# Patient Record
Sex: FEMALE | Race: BLACK OR AFRICAN AMERICAN | NOT HISPANIC OR LATINO | URBAN - METROPOLITAN AREA
[De-identification: names, ages, dates, MRNs, and addresses within clinical notes are randomized per-mention and may not be internally consistent; named-entity substitution may affect disease eponyms.]

---

## 2022-06-05 ENCOUNTER — EMERGENCY (EMERGENCY)
Facility: HOSPITAL | Age: 22
LOS: 1 days | Discharge: HOME | End: 2022-06-05
Attending: EMERGENCY MEDICINE | Admitting: STUDENT IN AN ORGANIZED HEALTH CARE EDUCATION/TRAINING PROGRAM
Payer: MEDICAID

## 2022-06-05 VITALS
RESPIRATION RATE: 18 BRPM | TEMPERATURE: 98 F | SYSTOLIC BLOOD PRESSURE: 108 MMHG | OXYGEN SATURATION: 100 % | HEART RATE: 57 BPM | DIASTOLIC BLOOD PRESSURE: 69 MMHG

## 2022-06-05 VITALS
SYSTOLIC BLOOD PRESSURE: 100 MMHG | RESPIRATION RATE: 18 BRPM | HEART RATE: 96 BPM | TEMPERATURE: 98 F | DIASTOLIC BLOOD PRESSURE: 56 MMHG | OXYGEN SATURATION: 100 %

## 2022-06-05 LAB
APPEARANCE UR: ABNORMAL
BACTERIA # UR AUTO: ABNORMAL
BILIRUB UR-MCNC: NEGATIVE — SIGNIFICANT CHANGE UP
COLOR SPEC: YELLOW — SIGNIFICANT CHANGE UP
DIFF PNL FLD: ABNORMAL
EPI CELLS # UR: 1 /HPF — SIGNIFICANT CHANGE UP (ref 0–5)
GLUCOSE UR QL: NEGATIVE — SIGNIFICANT CHANGE UP
HYALINE CASTS # UR AUTO: 0 /LPF — SIGNIFICANT CHANGE UP (ref 0–7)
KETONES UR-MCNC: NEGATIVE — SIGNIFICANT CHANGE UP
LEUKOCYTE ESTERASE UR-ACNC: ABNORMAL
NITRITE UR-MCNC: POSITIVE
PH UR: 7 — SIGNIFICANT CHANGE UP (ref 5–8)
PROT UR-MCNC: ABNORMAL
RBC CASTS # UR COMP ASSIST: SIGNIFICANT CHANGE UP /HPF (ref 0–4)
SP GR SPEC: 1.02 — SIGNIFICANT CHANGE UP (ref 1–1.05)
UROBILINOGEN FLD QL: SIGNIFICANT CHANGE UP
WBC UR QL: SIGNIFICANT CHANGE UP /HPF (ref 0–5)

## 2022-06-05 PROCEDURE — 99285 EMERGENCY DEPT VISIT HI MDM: CPT

## 2022-06-05 PROCEDURE — 76830 TRANSVAGINAL US NON-OB: CPT | Mod: 26

## 2022-06-05 RX ORDER — CEFPODOXIME PROXETIL 100 MG
100 TABLET ORAL ONCE
Refills: 0 | Status: COMPLETED | OUTPATIENT
Start: 2022-06-05 | End: 2022-06-05

## 2022-06-05 RX ORDER — CEFPODOXIME PROXETIL 100 MG
1 TABLET ORAL
Qty: 28 | Refills: 0
Start: 2022-06-05 | End: 2022-06-18

## 2022-06-05 RX ADMIN — Medication 100 MILLIGRAM(S): at 15:24

## 2022-06-05 NOTE — ED PROVIDER NOTE - ATTENDING CONTRIBUTION TO CARE
I performed a face to face evaluation of this patient and performed a full history and physical examination on the patient.  I agree with the resident's history, physical examination, and plan of the patient.  22F G0 no significant PMH p/w vag spotting. VSS in ED. Pelvic exam with scant blood and IUD string in place, lungs ctab, abd NT  Will eval first with ua/ucx, ucg, gc/chlamydia and then reassess. Concern for STI vs. pyelo vs. simple UTI.  Right cvat mild. Pt declining iv and labs- understands risk of no iv and checking labs, and inability to give iv antibiotics.  PT is 98.8 rectally, well appearing- has capacity to decline treatment and understands risk.

## 2022-06-05 NOTE — ED PROVIDER NOTE - PROGRESS NOTE DETAILS
Bhanu LAWRENCE (PGY-2)  UA showing likely pyelo, however still pending tvus at this time. explained to pt benefit of placing IV to obtains labs and give 1 dose of IV abx, however pt is deferring at this time Bhanu LAWRENCE (PGY-2)  tuvs normal, will d/c to home with pcp f/u and return precautions

## 2022-06-05 NOTE — ED PROVIDER NOTE - NSFOLLOWUPINSTRUCTIONS_ED_ALL_ED_FT
You were found to have Pyelonephritis, which is an infection of the kidney    Please take Cefpodoxime twice a day for the next 14 days    Please take over the counter pain medications such as Motrin (600mg every 6 hours) and Tylenol (650mg every 4 hours) for pain, and follow up with your primary care doctor within the next 48-72 hours    Return to the Emergency Department if you have any new or worsening symptoms, including but not limited to persistent vomiting, severe flank/abdominal pain, dehydration, weakness, or uncontrollable fevers

## 2022-06-05 NOTE — ED PROVIDER NOTE - NS ED ROS FT
CONST: no fevers, no chills  ENT: no sore throat  CV: no chest pain, no leg swelling  RESP: no shortness of breath, no cough  ABD: +abdominal pain, +nausea, +vomiting, no diarrhea  : no dysuria, no flank pain, no hematuria, +vaginal spotting  MSK: no back pain, no extremity pain  SKIN:  no rash CONST: no fevers, no chills  ENT: no sore throat  CV: no chest pain, no leg swelling  RESP: no shortness of breath, no cough  ABD: +abdominal pain, +nausea, +vomiting, no diarrhea  : no dysuria,  flank pain, no hematuria, +vaginal spotting  MSK: no back pain, no extremity pain  SKIN:  no rash

## 2022-06-05 NOTE — ED PROVIDER NOTE - CARE PLAN
Principal Discharge DX:	Flank pain   1 Principal Discharge DX:	Flank pain  Secondary Diagnosis:	Acute pyelonephritis

## 2022-06-05 NOTE — ED PROVIDER NOTE - PHYSICAL EXAMINATION
Physical Exam:  Gen:  awake alert   HEENT:  normal conjunctiva, oral mucosa moist  Lung: CTAB, no respiratory distress, no wheezes/rhonchi/rales B/L, speaking in full sentences  CV: RRR, no murmurs, rubs or gallops  Abd: soft, NT, ND, no guarding, no rigidity, no rebound tenderness, R CVA tenderness   MSK: no visible deformities  Skin: Warm, well perfused, no rash, no leg swelling  ~Andrez Drummond MD (PGY-2) Physical Exam:  Gen:  awake alert   HEENT:  normal conjunctiva, oral mucosa moist  Lung: CTAB, no respiratory distress, no wheezes/rhonchi/rales B/L, speaking in full sentences  CV: RRR, no murmurs, rubs or gallops  Abd: soft, NT, ND, no guarding, no rigidity, no rebound tenderness, R CVA tenderness   Pelvic (chaperone Dr. Cartagena): no external lesions, IUD string at os, os closed, scan blood at os  MSK: no visible deformities  Skin: Warm, well perfused, no rash, no leg swelling  ~Andrez Drummond MD (PGY-2)

## 2022-06-05 NOTE — ED PROVIDER NOTE - NSFOLLOWUPCLINICS_GEN_ALL_ED_FT
[facility]  Internal Medicine  [address] [place] [address]  Phone: [phone]  Fax: [phone]  Follow Up Time: Urgent

## 2022-06-05 NOTE — ED PROVIDER NOTE - OBJECTIVE STATEMENT
22F G0 no significant PMH p/w vag spotting for the past 3 days with 1 episode of vomiting each day. Also endorsing R flank pain with pelvic pressure, which feels like her prior UTI's. Had chills but no fevers. No chest pain, SOB, leg swelling

## 2022-06-05 NOTE — ED PROVIDER NOTE - PATIENT PORTAL LINK FT
You can access the FollowMyHealth Patient Portal offered by [facility] by registering at the following website: [email or web address]. By joining [facility]’s FollowMyHealth portal, you will also be able to view your health information using other applications (apps) compatible with our system.

## 2022-06-05 NOTE — ED PROVIDER NOTE - CLINICAL SUMMARY MEDICAL DECISION MAKING FREE TEXT BOX
22F G0 no significant PMH p/w vag spotting. VSS in ED. Pelvic exam with scant blood and IUD string in place, lungs ctab, abd NT  Will eval first with ua/ucx, ucg, gc/chlamydia and then reassess. Concern for STI vs. pyelo vs. simple UTI

## 2022-06-06 LAB
C TRACH RRNA SPEC QL NAA+PROBE: SIGNIFICANT CHANGE UP
N GONORRHOEA RRNA SPEC QL NAA+PROBE: SIGNIFICANT CHANGE UP
SPECIMEN SOURCE: SIGNIFICANT CHANGE UP

## 2025-03-22 NOTE — ED PROVIDER NOTE - IV ALTEPLASE ADMIN OUTSIDE HIDDEN
Patient : Lisa Allen Age: 53 year old Sex: female   MRN: [id number] Encounter Date: 3/22/2025      History     Chief Complaint   Patient presents with    Vomiting    Diarrhea    Abdominal Pain           HPI  History of Present Illness  The patient presents for evaluation of nausea, nbnb vomiting.    She reports a persistent state of malaise, with no discernible improvement in her condition but also no worsening - generalized abd discomfort is unchanged from recent ED visit. She has not utilized any antiemetic medications that have previously demonstrated efficacy in managing her symptoms. She has been taking zofran at home with minimal improvement.        Allergies   Allergen Reactions    Ace Inhibitors Cough    Byetta GI UPSET    Famotidine NAUSEA    Metformin GI UPSET     \"high titration metformin\"       Discharge Medication List as of 3/22/2025  1:19 PM        Prior to Admission Medications    Details   ondansetron (ZOFRAN ODT) 4 MG disintegrating tablet Place 1 tablet onto the tongue every 6 hours.Eprescribe, Disp-10 tablet, R-0      Insulin Lispro, 1 Unit Dial, (HumaLOG KwikPen) 100 UNIT/ML pen-injector Inject 40 Units into the skin.Historical Med, SubcutaneousPrime 2 units before each dose. This is for insulin pen.      Cholecalciferol 125 mcg (5,000 units) capsule Take 1 capsule by mouth daily.Eprescribe, Oral, DAILY, Disp-90 capsule, R-3125 mcg = 5,000 unitsPer the FDA, the units of measure for vitamin D have changed from international units (IUs) to metric units (eg, micrograms or milligrams). It is advised t o order in metric units. 125 mcg = 5,000 units      Insulin Glargine-yfgn 100 UNIT/ML Solution Pen-injector Inject 40 Units into the skin in the morning and 40 Units in the evening. Prime 2 units before each dose.Eprescribe, Disp-90 mL, R-1Please dispense whatever insurance prefers.      torsemide (DEMADEX) 20 MG tablet TAKE 1 TABLET BY MOUTH DAILYEprescribe, Disp-90 tablet, R-1      Icosapent  Ethyl 1 g Cap Take 2 capsules by mouth in the morning and 2 capsules in the evening. Take with meals.Eprescribe, Disp-360 capsule, R-1Replaces Lovasa      blood glucose test strip Test blood sugar 4 times daily.Disp-400 strip, R-3, Eprescribe      blood glucose meter Test blood sugar 4 times daily. Diagnosis:E11.3213 Z79.4.  Dispense compatible test stripsDisp-1 kit, R-0, EprescribeOne Touch Meter      Lancets Misc Use to test blood sugar 4 times daily.Disp-400 each, R-3, Eprescribe      dapagliflozin (Farxiga) 10 MG tablet TAKE 1 TABLET BY MOUTH DAILY BEFORE BREAKFASTEprescribe, Disp-90 tablet, R-1      insulin aspart (NovoLOG FlexPen) 100 UNIT/ML pen-injector Inject up to 60 units 10-15 minutes before each meal + sliding scale. Max daily dose of 180 units. Prime 2 units before each dose.Eprescribe, Disp-180 mL, R-0Prime 2 units before each dose. This is for insulin pen. Dispense quantity is 15 mL per box (p ackage).      fenofibrate (TRICOR) 145 MG tablet Take 1 tablet by mouth daily. Begin taking on October 10, 2024.Eprescribe, Disp-90 tablet, R-3      Insulin Pen Needle (B-D U/F PEN NEEDLE) 31G X 5 MM Misc USE TO INJECT INSULIN 5 TIMES DAILY (REMOVE NEEDLE COVER TO EXPOSE NEEDLE BEFORE INJECTING)Disp-100 each, R-10, Eprescribe      rosuvastatin (CRESTOR) 10 MG tablet Take 1 tablet by mouth daily.Eprescribe, Disp-90 tablet, R-3      aspirin (ECOTRIN) 81 MG EC tablet Take 1 tablet by mouth daily.Eprescribe, Disp-90 tablet, R-3      oxyCODONE, IMM REL, (ROXICODONE) 5 MG immediate release tablet Take 1 tablet by mouth every 6 hours as needed for Pain.Historical Med      amLODIPine (NORVASC) 5 MG tablet Take 1 tablet by mouth daily.Eprescribe, Disp-30 tablet, R-1      albuterol 108 (90 Base) MCG/ACT inhaler Inhale 2 puffs into the lungs every 4 hours as needed for Shortness of Breath or Wheezing.Eprescribe, Disp-1 each, R-1      nystatin (MYCOSTATIN) 339990 UNIT/GM powder Apply topically 2 times daily.Disp-30 g, R-0,  Eprescribe      Melatonin 10 MG Cap Take 2 capsules by mouth at bedtime.Historical Med      omeprazole (PriLOSEC) 40 MG capsule Take 1 capsule by mouth daily. Indications: Gastroesophageal Reflux DiseaseEprescribe, Disp-90 capsule, R-3Requests 90 day supply.  If insurance won't allow adjust refills to reflect 1 year.      docusate sodium-sennosides (SENOKOT S) 50-8.6 MG per tablet Take 2 tablets by mouth every 24 hours as needed.Historical Med      traZODone (DESYREL) 100 MG tablet Take one and one-half tablets (=150MG) by mouth nightly.Historical Med      metoPROLOL tartrate (LOPRESSOR) 25 MG tablet Take 1 tablet by mouth 2 times daily.Historical Med      gabapentin (NEURONTIN) 400 MG capsule Indications: Neuropathic Pain Take 1 capsule (400mg) by mouth in the morning and 2 capsules (800mg) by mouth at bedtimeHistorical Med           New Prescriptions    Details   prochlorperazine (COMPAZINE) 10 MG tablet Take 1 tablet by mouth every 8 hours as needed for Vomiting or Nausea.Eprescribe, Disp-30 tablet, R-1             Past Medical History:   Diagnosis Date    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, stage 1 through stage 4 or unspecified chronic kidney disease, without heart failure     Chronic pain     Depression     Dyslipidemia     Gastroparesis     Heart murmur     Palpitations     Proliferative diabetic retinopathy of both eyes  (CMD)     Uncontrolled type 2 diabetes mellitus     Vitreous hemorrhage, left eye  (CMD)        Past Surgical History:   Procedure Laterality Date    AMPUTATE LOWER LEG AT KNEE Left 2022    below the knee    APPENDECTOMY  1985    CARPAL TUNNEL RELEASE Bilateral     CARPAL TUNNEL RELEASE  2005     SECTION, LOW TRANSVERSE  2006    ESOPHAGOGASTRODUODENOSCOPY TRANSORAL FLEX W/BX SINGLE OR MULT  2022    FACET LUMBAR BLOCK  2012    Lumbar Nerve Root Block    KNEE SURGERY      LAMINECTOMY      L5-S1    LUMBAR EPIDURAL  INJECTION  2012    LUMBAR EPIDURAL INJECTION  10/22/2012    LUMBAR EPIDURAL INJECTION  2013    OPEN ACCESS COLONOSCOPY  2020    [facility] report in media tab.  Normal repeat 10 years.    TOE AMPUTATION Left 2022    4th digit    TRIGGER FINGER RELEASE Right     TUBAL LIGATION  2006       Family History   Problem Relation Age of Onset    Cancer Mother     Patient is unaware of any medical problems Sister     Patient is unaware of any medical problems Sister     Cataracts Maternal Grandmother     Glaucoma Maternal Grandmother     Cataracts Maternal Grandfather     Glaucoma Maternal Grandfather        Social History     Tobacco Use    Smoking status: Former     Current packs/day: 0.00     Types: Cigarettes     Quit date:      Years since quittin.2    Smokeless tobacco: Never   Vaping Use    Vaping status: never used   Substance Use Topics    Alcohol use: Yes     Alcohol/week: 2.0 standard drinks of alcohol     Types: 2 Shots of liquor per week     Comment: rare    Drug use: Never       E-cigarette/Vaping    E-Cigarette/Vaping Use Never Used      E-Cigarette/Vaping Substances & Devices    Nicotine No     THC No     CBD No     Flavoring No     Disposable No     Pre-filled or Refillable Cartridge No     Refillable Tank No     Pre-filled Pod No        Review of Systems   All other systems reviewed and are negative.      Physical Exam     ED Triage Vitals [25 1117]   ED Triage Vitals Group      Temp 99.2 °F (37.3 °C)      Heart Rate 79      Resp 20      BP (!) 217/87      SpO2 94 %      EtCO2 mmHg       Height       Weight 231 lb (104.8 kg)      Weight Scale Used Scale in bed      BMI (Calculated)       IBW/kg (Calculated)        Physical Exam  Vitals and nursing note reviewed.   Constitutional:       General: She is not in acute distress.     Appearance: Normal appearance. She is not ill-appearing, toxic-appearing or diaphoretic.   HENT:      Head: Normocephalic and atraumatic.      Right  Ear: External ear normal.      Left Ear: External ear normal.      Nose: Nose normal.      Mouth/Throat:      Mouth: Mucous membranes are moist.   Eyes:      General: No scleral icterus.        Right eye: No discharge.         Left eye: No discharge.      Extraocular Movements: Extraocular movements intact.      Conjunctiva/sclera: Conjunctivae normal.      Pupils: Pupils are equal, round, and reactive to light.   Cardiovascular:      Rate and Rhythm: Normal rate and regular rhythm.      Pulses: Normal pulses.   Pulmonary:      Effort: Pulmonary effort is normal. No respiratory distress.   Abdominal:      General: There is no distension.      Palpations: Abdomen is soft. There is no mass.      Tenderness: There is no abdominal tenderness. There is no guarding or rebound.      Comments: No ttp throughout   Musculoskeletal:         General: No swelling, tenderness, deformity or signs of injury. Normal range of motion.      Cervical back: Normal range of motion and neck supple. No rigidity.      Right lower leg: No edema.      Left lower leg: No edema.   Skin:     General: Skin is warm and dry.      Capillary Refill: Capillary refill takes less than 2 seconds.      Coloration: Skin is not jaundiced or pale.      Findings: No bruising, erythema, lesion or rash.   Neurological:      General: No focal deficit present.      Mental Status: She is alert and oriented to person, place, and time.      Cranial Nerves: No cranial nerve deficit.      Sensory: No sensory deficit.      Motor: No weakness.      Coordination: Coordination normal.      Gait: Gait normal.   Psychiatric:         Mood and Affect: Mood normal.         ED Course     Procedures    Lab Results     Results for orders placed or performed during the hospital encounter of 03/22/25   Basic Metabolic Panel    Specimen: Blood, Venous   Result Value Ref Range    Fasting Status      Sodium 140 135 - 145 mmol/L    Potassium 4.2 3.4 - 5.1 mmol/L    Chloride 106 97 - 110  mmol/L    Carbon Dioxide 24 21 - 32 mmol/L    Anion Gap 14 7 - 19 mmol/L    Glucose 206 (H) 70 - 99 mg/dL    BUN 52 (H) 6 - 20 mg/dL    Creatinine 2.30 (H) 0.51 - 0.95 mg/dL    Glomerular Filtration Rate 24 (L) >=60    BUN/Cr 23 7 - 25    Calcium 8.6 8.4 - 10.2 mg/dL   TROPONIN I, HIGH SENSITIVITY    Specimen: Blood, Venous   Result Value Ref Range    Troponin I, High Sensitivity 30 <52 ng/L   Hepatic Function Panel    Specimen: Blood, Venous   Result Value Ref Range    Albumin 3.5 3.4 - 5.0 g/dL    Bilirubin, Total 0.5 0.2 - 1.0 mg/dL    Bilirubin, Direct 0.2 0.0 - 0.2 mg/dL    Alkaline Phosphatase 31 (L) 45 - 117 Units/L    GPT/ALT 57 <64 Units/L    GOT/AST 43 (H) <=37 Units/L    Protein, Total 7.5 6.4 - 8.2 g/dL   Lipase    Specimen: Blood, Venous   Result Value Ref Range    Lipase 53 15 - 77 Units/L   Magnesium    Specimen: Blood, Venous   Result Value Ref Range    Magnesium 2.8 (H) 1.7 - 2.4 mg/dL   CBC with Automated Differential (performable only)    Specimen: Blood, Venous   Result Value Ref Range    WBC 11.6 (H) 4.2 - 11.0 K/mcL    RBC 3.66 (L) 4.00 - 5.20 mil/mcL    HGB 11.6 (L) 12.0 - 15.5 g/dL    HCT 34.0 (L) 36.0 - 46.5 %    MCV 92.9 78.0 - 100.0 fl    MCH 31.7 26.0 - 34.0 pg    MCHC 34.1 32.0 - 36.5 g/dL    RDW-CV 13.8 11.0 - 15.0 %    RDW-SD 47.4 39.0 - 50.0 fL     140 - 450 K/mcL    NRBC 0 <=0 /100 WBC    Neutrophil, Percent 76 %    Lymphocytes, Percent 13 %    Mono, Percent 6 %    Eosinophils, Percent 3 %    Basophils, Percent 0 %    Immature Granulocytes 2 %    Absolute Neutrophils 8.9 (H) 1.8 - 7.7 K/mcL    Absolute Lymphocytes 1.5 1.0 - 4.0 K/mcL    Absolute Monocytes 0.6 0.3 - 0.9 K/mcL    Absolute Eosinophils  0.3 0.0 - 0.5 K/mcL    Absolute Basophils 0.0 0.0 - 0.3 K/mcL    Absolute Immature Granulocytes 0.2 0.0 - 0.2 K/mcL       EKG Results     EKG Interpretation  Rate: 78  Rhythm: normal sinus rhythm   Abnormality: no sig st/t wave changes    EKG tracing interpreted by ED  physician    Radiology Results     Imaging Results    None         ED Medication Orders (From admission, onward)      Ordered Start     Status Ordering Provider    03/22/25 1124 03/22/25 1125  prochlorperazine (COMPAZINE) injection 10 mg  ONCE         Last MAR action: Given ARON SERVIN    03/22/25 1124 03/22/25 1125  sodium chloride (NORMAL SALINE) 0.9 % bolus 1,000 mL  ONCE         Last MAR action: Completed ARON SERVIN                 Medical Decision Making  52 y/o F p/w above. Broad ddx considered. Old records reviewed, pt had CT scan at last visit, symptoms not worsening and no ttp on exam today, will recheck labs but repeat imaging felt to be greater risk than expected benefit, pt agrees with plan. IVF, compazine.    Pt reassessed, feeling better. PO challenge tolerated well by pt, states she feels well and would like to go home. Patient was instructed to return to the Emergency Department right away for any new, worsening, or recurrent symptoms, otherwise pt to f/u as directed.    Final patient recheck:  The patient was feeling better, tolerating po, and was clinically stable and comfortable with being discharged home.  The ED clinical findings, lab and radiology results, diagnosis, treatment, continued plan of care-discharge instructions and follow up recommendations were discussed with the patient.  The patient was also given pre-cautions related to their diagnosis. The patient verbalizes understanding of all instructions.  Questions regarding these above documented discussions were answered.      Clinical Impression     ED Diagnosis   1. Nausea and vomiting, unspecified vomiting type            Disposition        Discharge 3/22/2025  1:18 PM  Lisa Allen discharge to home/self care.             Aron Servin MD  03/22/25 [id number]     show